# Patient Record
Sex: FEMALE | Race: WHITE
[De-identification: names, ages, dates, MRNs, and addresses within clinical notes are randomized per-mention and may not be internally consistent; named-entity substitution may affect disease eponyms.]

---

## 2017-06-29 ENCOUNTER — HOSPITAL ENCOUNTER (EMERGENCY)
Dept: HOSPITAL 39 - ER | Age: 67
Discharge: HOME | End: 2017-06-29
Payer: MEDICARE

## 2017-06-29 VITALS — SYSTOLIC BLOOD PRESSURE: 123 MMHG | DIASTOLIC BLOOD PRESSURE: 80 MMHG | OXYGEN SATURATION: 98 %

## 2017-06-29 VITALS — TEMPERATURE: 96.9 F

## 2017-06-29 DIAGNOSIS — Z79.899: ICD-10-CM
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
## 2017-06-29 DIAGNOSIS — S61.211A: Primary | ICD-10-CM

## 2017-06-29 NOTE — ED.PDOC
History of Present Illness





- General


Chief Complaint: Skin/Abrasion/Tear


Stated Complaint: laceration left index finger


Time Seen by Provider: 06/29/17 11:08


Source: patient, RN notes reviewed, Vital Signs reviewed


Exam Limitations: no limitations





- History of Present Illness


Initial Comments: 





Omayra Nguyen 66 y/o female stated that she accidentally lacerated left index 

finger with  at home 


Timing/Duration: just prior to arrival


Location: hands


Improving Factors: rest


Worsening Factors: movement


Associated Symptoms: denies symptoms


Allergies/Adverse Reactions: 


Allergies





NO KNOWN ALLERGY Allergy (Unverified 01/07/14 09:05)


 








Home Medications: 


Ambulatory Orders





Calcium  PO DAILY 01/07/14 


Cetirizine HCl [Zyrtec]  PO DAILY 01/07/14 


Multiple Vitamins W/ Minerals [Multi For Her 50+]  PO DAILY 01/07/14 


Omega-3 Fatty Acids [Fish Oil Triple Strength]  PO DAILY 01/07/14 


raNITIdine HCL [Zantac] 0 mg PO BID 01/07/14 


Cephalexin 1,000 mg PO BID #20 cap 06/29/17 











Review of Systems





- Review of Systems


Constitutional: States: no symptoms reported


EENTM: States: no symptoms reported


Respiratory: States: no symptoms reported


Cardiology: States: no symptoms reported


Gastrointestinal/Abdominal: States: no symptoms reported


Genitourinary: States: no symptoms reported


Musculoskeletal: States: no symptoms reported


Skin: States: see HPI





Past Medical History (General)





- Patient Medical History


Hx Congestive Heart Failure: No


Hx Diabetes: No





Family Medical History





- Family History


  ** Mother


Family History: Unknown





Physical Exam





- Physical Exam


General Appearance: Alert, No apparent distress


Eyes, Ears, Nose, Throat Exam: PERRL/EOMI, normal ENT inspection, TMs normal


Neck: non-tender, full range of motion, supple


Cardiovascular/Chest: normal peripheral pulses, regular rate, rhythm, no edema, 

no gallop, no murmur


Respiratory: chest non-tender, lungs clear, normal breath sounds


Gastrointestinal/Abdominal: normal bowel sounds, non tender, soft, no 

organomegaly


Extremity: normal range of motion, non-tender, normal inspection


Neurologic: no motor/sensory deficits, alert, normal mood/affect, oriented x 3


Skin Exam: warm/dry, normal color


Skin Problem Location: other - left index finger,volar aspect no nail injury


Skin Character: other - laceration non bleeding non gaping


Lymphatic: no adenopathy





Progress





- Progress


Progress: 





06/29/17 11:25


Cleanse with sterile NS then applied triple antibiotic covered with sterile 

dressing.





Departure





- Departure


Clinical Impression: 


 Laceration of left index finger





Time of Disposition: 11:28


Disposition: Discharge to Home or Self Care


Condition: Good


Instructions:  DI for Minor Laceration


Referrals: 


Clyde Dial MD [Primary Care Provider] - 1-2 Weeks


Prescriptions: 


Cephalexin 1,000 mg PO BID #20 cap


Home Medications: 


Ambulatory Orders





Calcium  PO DAILY 01/07/14 


Cetirizine HCl [Zyrtec]  PO DAILY 01/07/14 


Multiple Vitamins W/ Minerals [Multi For Her 50+]  PO DAILY 01/07/14 


Omega-3 Fatty Acids [Fish Oil Triple Strength]  PO DAILY 01/07/14 


raNITIdine HCL [Zantac] 0 mg PO BID 01/07/14 


Cephalexin 1,000 mg PO BID #20 cap 06/29/17 








Additional Instructions: 


Keep wound dressing for one week then replacer it with regular band aid

## 2017-11-21 ENCOUNTER — HOSPITAL ENCOUNTER (OUTPATIENT)
Dept: HOSPITAL 39 - MAMMO | Age: 67
Discharge: HOME | End: 2017-11-21
Attending: FAMILY MEDICINE
Payer: MEDICARE

## 2017-11-21 DIAGNOSIS — Z12.31: Primary | ICD-10-CM

## 2017-11-21 PROCEDURE — 77063 BREAST TOMOSYNTHESIS BI: CPT

## 2017-11-22 NOTE — MAM
EXAM DESCRIPTION: 

3D Screening BILATERAL : Digital Mammography.



CLINICAL HISTORY: 

67 years Female SCREENING . No complaints. No family history

breast cancer. Postmenopausal. HRT 5 or more years ago.



COMPARISON: 

2-D digital screening bilateral studies 11/8/2016 and 11/2/2015. 

   Report from prior examination also reviewed.



TECHNIQUE: 

Bilateral CC and MLO projection full-field images, 3-D

tomosynthesis digital mammographic technique. Also bilateral 

synthesized CC/ MLO full-field images.  CAD not utilized.



FINDINGS: 

The breast parenchymal density pattern is:  Scattered areas of

fibroglandular density.   No skin thickening or nipple retraction

 bilateral coarse calcifications and microcalcifications.

Bilateral vascular calcifications.

No focal, stellate mass or density, focal asymmetry , and no

suspicious microcalcifications bilaterally. Stable mammograms

compared to prior study, taking into account differences in

mammographic technique



IMPRESSION: 

BI-RADS CATEGORY: 2 - BENIGN FINDINGS.

FOLLOW UP: Routine digital bilateral  screening, one year

interval from  November 2017.



Written communication explaining the IMPRESSION and follow-up,

will be mailed to the patient and referring health care provider.



According to the American College of [employer], yearly mammograms

are recommended starting at age 40 and continuing as long as a

woman is in good health.  Any breast change noted on a breast

self-exam should be reported promptly to the patient's healthcare

provider.  Breast MRI is recommended for women with an

approximately 20-25% or greater lifetime risk of breast cancer,

including women with a strong family history of breast or ovarian

cancer and women who have been treated for Hodgkin's disease.



A negative mammographic report should not delay tissue diagnosis

in patients with significant clinical history or physical

findings.



Extremely dense breast tissue limits the sensitivity of digital

mammography. 



Electronically signed by:  Blake Foss MD  11/22/2017 1:57 PM

[facility] Workstation: [id number]

## 2018-03-16 ENCOUNTER — HOSPITAL ENCOUNTER (EMERGENCY)
Dept: HOSPITAL 39 - ER | Age: 68
Discharge: HOME | End: 2018-03-16
Payer: MEDICARE

## 2018-03-16 VITALS — OXYGEN SATURATION: 96 % | SYSTOLIC BLOOD PRESSURE: 119 MMHG | DIASTOLIC BLOOD PRESSURE: 74 MMHG

## 2018-03-16 VITALS — TEMPERATURE: 96.5 F

## 2018-03-16 DIAGNOSIS — Z87.442: ICD-10-CM

## 2018-03-16 DIAGNOSIS — Z85.828: ICD-10-CM

## 2018-03-16 DIAGNOSIS — N20.1: Primary | ICD-10-CM

## 2018-03-16 PROCEDURE — 85025 COMPLETE CBC W/AUTO DIFF WBC: CPT

## 2018-03-16 PROCEDURE — 80048 BASIC METABOLIC PNL TOTAL CA: CPT

## 2018-03-16 PROCEDURE — 83690 ASSAY OF LIPASE: CPT

## 2018-03-16 PROCEDURE — 82550 ASSAY OF CK (CPK): CPT

## 2018-03-16 PROCEDURE — 74176 CT ABD & PELVIS W/O CONTRAST: CPT

## 2018-03-16 PROCEDURE — 84484 ASSAY OF TROPONIN QUANT: CPT

## 2018-03-16 PROCEDURE — 81001 URINALYSIS AUTO W/SCOPE: CPT

## 2018-03-16 PROCEDURE — 82553 CREATINE MB FRACTION: CPT

## 2018-03-16 PROCEDURE — 85610 PROTHROMBIN TIME: CPT

## 2018-03-16 PROCEDURE — 36415 COLL VENOUS BLD VENIPUNCTURE: CPT

## 2018-03-16 PROCEDURE — 85730 THROMBOPLASTIN TIME PARTIAL: CPT

## 2018-03-16 PROCEDURE — 80076 HEPATIC FUNCTION PANEL: CPT

## 2018-03-16 NOTE — CT
EXAM DESCRIPTION: 



Abdoment/Pelvis w/o Contrast



CLINICAL HISTORY: 



68 years Female, flank pain right



COMPARISON: 



None.



TECHNIQUE: 



Contiguous axial CT images of the abdomen and pelvis were

acquired without administration of intravenous contrast. Coronal

and sagittal reformatted images are provided. 



This exam was performed according to our departmental

dose-optimization program which includes use of Automated

Exposure Control, adjustment of the mA and/or kV according to

patient size and/or use of iterative reconstruction technique.



FINDINGS: 



Chest base: Unremarkable.



Liver: Unremarkable.

Gallbladder: Unremarkable.

Spleen: Unremarkable.

Adrenals: Unremarkable.

Pancreas: Unremarkable.



Right Kidney: Obstructing 2 mm stone within the right UVJ, image

72 series 2 resulting in mild right hydronephrosis and

hydroureter. Multiple parapelvic cysts.

Left Kidney: Multiple parapelvic cysts. No renal stones or

hydronephrosis.



Aorta and branch vessels: Mild calcific atherosclerosis of the

aortoiliac vessels.

Lymph nodes: No lymphadenopathy.



Bowels: No obstruction. 

Colon: No wall thickening. 

Appendix: Normal.

Peritoneum: No free air or free fluid. 



Pelvic organs: Prior hysterectomy. No large adnexal mass.

Bladder: Unremarkable.



Bones and soft tissues: No acute osseous or soft tissue

abnormalities.



IMPRESSION: 



Obstructing 2 mm stone within the right UVJ resulting in mild

right hydronephrosis and hydroureter.



Bilateral parapelvic cysts.



Electronically signed by:  Troy Boone MD  3/16/2018 11:22 PM CDT

Workstation: [id number]

## 2018-03-16 NOTE — ED.PDOC
History of Present Illness





- General


Chief Complaint:  Problem


Stated Complaint: low back pain, diff urinating


Time Seen by Provider: 03/16/18 21:52


Source: patient


Exam Limitations: no limitations





- History of Present Illness


Initial Comments: 





Omayra Nguyen 67 y/o female stated that she had intermittent right flank pain 

radiating to lower abdominal area which started this afternoon gradually 

getting worse.Had also pain on urination no dysuria and had history of passing 

kidney stone 2x in the past.No fever /chills feels nauseated no constipation/

diarrhea.  


Timing/Duration: 4-6 hours, getting worse, intermittent


Improving Factors: nothing


Worsening Factors: nothing


Associated Symptoms: other - see hpi


Allergies/Adverse Reactions: 


Allergies





NO KNOWN ALLERGY Allergy (Unverified 01/07/14 09:05)


 








Home Medications: 


Ambulatory Orders





Calcium PO DAILY 01/07/14 


Cetirizine HCl [Zyrtec] PO DAILY 01/07/14 


Multiple Vitamins W/ Minerals [Multi For Her 50+] PO DAILY 01/07/14 


Omega-3 Fatty Acids [Fish Oil Triple Strength] PO DAILY 01/07/14 


raNITIdine HCL [Zantac] 0 mg PO BID 01/07/14 


Cephalexin 1,000 mg PO BID #20 cap 06/29/17 


Acetamin W/Cod #3 Tab [Tylenol w/CODEINE #3] 1 ea PO Q8HRS #14 tab 03/16/18 











Review of Systems





- Review of Systems


Constitutional: States: no symptoms reported


EENTM: States: no symptoms reported


Respiratory: States: no symptoms reported


Cardiology: States: no symptoms reported


Gastrointestinal/Abdominal: States: see HPI


Genitourinary: States: see HPI


All other Systems: Reviewed and Negative, No Change from Baseline





Past Medical History (General)





- Patient Medical History


Hx Seizures: No


Hx Stroke: No


Hx Asthma: No


Hx of COPD: No


Hx Cardiac Disorders: No


Hx Congestive Heart Failure: No


Hx Hypertension: No


Hx Thyroid Disease: No


Hx Diabetes: No


Hx Renal Disease: No


Hx Cancer: Yes - skin


Hx Other PMH: Yes - nephrolithiasis


Surgical History: Hysterectomy, other - hysterectomy,





- Vaccination History


Hx Tetanus, Diphtheria Vaccination: No


Hx Influenza Vaccination: No


Hx Pneumococcal Vaccination: No





- Social History


Hx Tobacco Use: No


Hx Alcohol Use: No





Family Medical History





- Family History


  ** Mother


Family History: Unknown


Hx Family Cancer: Yes - leukemia-dad





Physical Exam





- Physical Exam


General Appearance: Alert, Comfortable, No apparent distress


Eye Exam: bilateral normal


Ears, Nose, Throat: hearing grossly normal, normal ENT inspection


Neck: full range of motion, supple


Respiratory: lungs clear, normal breath sounds


Cardiovascular/Chest: normal peripheral pulses, regular rate, rhythm, no murmur


Peripheral Pulses: radial,right: 2+, radial,left: 2+


Gastrointestinal/Abdominal: soft, tenderness - lower abdomen no peritoneal signs


Back Exam: no CVA tenderness, no vertebral tenderness


Extremity: no pedal edema


Neurologic: alert, oriented x 3


Skin Exam: normal color, warm/dry





Progress





- Progress


Progress: 





03/16/18 22:04


 Vital Signs - 8 hr











  03/16/18





  21:23


 


Temperature 96.5 F L


 


Pulse Rate [ 72





left] 


 


Respiratory 18





Rate 


 


Blood Pressure 170/82





[left] 


 


O2 Sat by Pulse 99





Oximetry 














- Results/Orders


Results/Orders: 





 





03/16/18 21:53


IV Care:Saline Lock per Protoc QSHIFT 








 Laboratory Results - last 24 hr











  03/16/18 03/16/18





  21:45 21:55


 


WBC   8.3


 


RBC   4.08 L


 


Hgb   13.1


 


Hct   37.7


 


MCV   92.4


 


MCH   32.1 H


 


MCHC   34.7


 


RDW   13.0


 


Plt Count   241


 


MPV   8.3


 


Absolute Neuts (auto)   4.00


 


Absolute Lymphs (auto)   3.80 H


 


Absolute Monos (auto)   0.50


 


Absolute Eos (auto)   0.10


 


Absolute Basos (auto)   0.00


 


Neutrophils %   47.6


 


Lymphocytes %   45.1


 


Monocytes %   5.7


 


Eosinophils %   1.2


 


Basophils %   0.4


 


PT   10.4


 


INR   0.920


 


PTT (SP)   24.3 L


 


Sodium   138


 


Potassium   4.2


 


Chloride   107


 


Carbon Dioxide   25


 


Anion Gap   10.2 L


 


BUN   22 H


 


Creatinine   0.92


 


BUN/Creatinine Ratio   23.9 H


 


Random Glucose   100


 


Serum Osmolality   279.1


 


Calcium   8.9


 


Magnesium   2.2


 


Total Bilirubin   0.2


 


Direct Bilirubin   < 0.1


 


Indirect Bilirubin   0.1 L


 


AST   27


 


ALT   24


 


Alkaline Phosphatase   70


 


Creatine Kinase   307 H*


 


CK-MB (CK-2)   7.5 H*


 


CK-MB (CK-2) %   2.44


 


Troponin I   < 0.02


 


Serum Total Protein   7.0


 


Albumin   4.0


 


Lipase   34


 


Urine Color  Yellow 


 


Urine Appearance  Clear 


 


Urine pH  5.5 


 


Ur Specific Gravity  1.025 


 


Urine Protein  Negative 


 


Urine Glucose (UA)  Negative 


 


Urine Ketones  Trace 


 


Urine Blood  Trace-lysed H 


 


Urine Nitrite  Negative 


 


Urine Bilirubin  Negative 


 


Urine Urobilinogen  0.2 


 


Ur Leukocyte Esterase  Negative 


 


Urine RBC  0-1 


 


Urine WBC  3-5 H 


 


Ur Epithelial Cells  0-1 


 


Urine Bacteria  Rare 














- EKG/XRAY/CT


CT Ordered: Yes - abd/p-obstructing 2 mm. right ureteral stone UVJ





Departure





- Departure


Clinical Impression: 


 Ureteral calculus, right, Ureteral colic, Flank pain, acute





Time of Disposition: 23:33


Disposition: Discharge to Home or Self Care


Condition: Good


Departure Forms:  ED Discharge - Pt. Copy, Patient Portal Self Enrollment


Instructions:  DI for Kidney Stones


Referrals: 


Clyde Dial MD [Primary Care Provider] - 1-2 Weeks


Prescriptions: 


Acetamin W/Cod #3 Tab [Tylenol w/CODEINE #3] 1 ea PO Q8HRS #14 tab


Home Medications: 


Ambulatory Orders





Calcium PO DAILY 01/07/14 


Cetirizine HCl [Zyrtec] PO DAILY 01/07/14 


Multiple Vitamins W/ Minerals [Multi For Her 50+] PO DAILY 01/07/14 


Omega-3 Fatty Acids [Fish Oil Triple Strength] PO DAILY 01/07/14 


raNITIdine HCL [Zantac] 0 mg PO BID 01/07/14 


Cephalexin 1,000 mg PO BID #20 cap 06/29/17 


Acetamin W/Cod #3 Tab [Tylenol w/CODEINE #3] 1 ea PO Q8HRS #14 tab 03/16/18 








Additional Instructions: 


Follow up with primary Md 19 March 2018;drink extra fluids;May also take Aleve 

one tablet am/pm for pain

## 2018-03-22 ENCOUNTER — HOSPITAL ENCOUNTER (OUTPATIENT)
Dept: HOSPITAL 39 - GMAB | Age: 68
End: 2018-03-22
Attending: FAMILY MEDICINE
Payer: MEDICARE

## 2018-03-22 DIAGNOSIS — N20.9: Primary | ICD-10-CM

## 2018-12-04 ENCOUNTER — HOSPITAL ENCOUNTER (OUTPATIENT)
Dept: HOSPITAL 39 - MAMMO | Age: 68
End: 2018-12-04
Attending: FAMILY MEDICINE
Payer: MEDICARE

## 2018-12-04 DIAGNOSIS — Z12.31: Primary | ICD-10-CM

## 2018-12-04 NOTE — MAM
EXAM DESCRIPTION: 

3D Screening BILATERAL : Digital Mammography.



CLINICAL HISTORY: 

68 years Female SCREENING . No complaints. No personal or family

history of breast cancer. Childbirth. Postmenopausal 25 years.

Taken HRT 5 or more years ago..  Lifetime risk of developing

breast cancer (Tyrer-Cuzick model)(%):  3.7.



COMPARISON: 

Bilateral screening digital breast tomosynthesis 11/21/2017.    



TECHNIQUE: 

Bilateral CC and MLO projection full-field images, digital

tomosynthesis mammographic technique. Bilateral digital 2-D

full-field MLO images. CAD not available for tomosynthesis or 2-D

images.  



FINDINGS: 

The breast parenchymal density pattern is:  Scattered areas of

fibroglandular density.  No skin thickening or nipple retraction.

 Bilateral solitary microcalcifications. Large coarse

calcification medial right breast. Bilateral vascular

calcifications.

No new focal, stellate mass or density, focal asymmetry , and no

suspicious microcalcifications bilaterally. Stable mammograms

compared to prior study. 



IMPRESSION: 

Benign exam.



BIRAD CATEGORY: 2 BENIGN FINDINGS.



RECOMMENDATIONS:

FOLLOW UP: Routine digital bilateral  mammographic screening, one

year interval from  December 2018.



Written communication explaining the IMPRESSION and follow-up,

will be mailed to the patient and referring health care provider.



According to the American College of Radiology, yearly mammograms

are recommended starting at age 40 and continuing as long as a

woman is in good health.  Any breast change noted on a breast

self-exam should be reported promptly to the patient's healthcare

provider.  Breast MRI is recommended for women with an

approximately 20-25% or greater lifetime risk of breast cancer,

including women with a strong family history of breast or ovarian

cancer and women who have been treated for Hodgkin's disease.



A negative mammographic report should not delay tissue diagnosis

in patients with significant clinical history or physical

findings.



Extremely dense breast tissue limits the sensitivity of digital

mammography. 



Electronically signed by:  Blake Foss MD  12/4/2018 6:21 PM [facility]

Workstation: [id number]

## 2019-04-03 ENCOUNTER — HOSPITAL ENCOUNTER (OUTPATIENT)
Dept: HOSPITAL 39 - AMB | Age: 69
Discharge: HOME | End: 2019-04-03
Attending: INTERNAL MEDICINE
Payer: MEDICARE

## 2019-04-03 VITALS — DIASTOLIC BLOOD PRESSURE: 57 MMHG | SYSTOLIC BLOOD PRESSURE: 123 MMHG | TEMPERATURE: 96.5 F | OXYGEN SATURATION: 98 %

## 2019-04-03 DIAGNOSIS — K29.50: ICD-10-CM

## 2019-04-03 DIAGNOSIS — K64.8: ICD-10-CM

## 2019-04-03 DIAGNOSIS — B96.81: ICD-10-CM

## 2019-04-03 DIAGNOSIS — Z90.710: ICD-10-CM

## 2019-04-03 DIAGNOSIS — D12.2: ICD-10-CM

## 2019-04-03 DIAGNOSIS — Z87.11: ICD-10-CM

## 2019-04-03 DIAGNOSIS — K57.30: ICD-10-CM

## 2019-04-03 DIAGNOSIS — K21.0: Primary | ICD-10-CM

## 2019-04-03 DIAGNOSIS — Z86.010: ICD-10-CM

## 2019-04-03 DIAGNOSIS — K44.9: ICD-10-CM

## 2019-04-03 DIAGNOSIS — Z79.899: ICD-10-CM

## 2019-04-03 DIAGNOSIS — Z88.8: ICD-10-CM

## 2019-04-03 PROCEDURE — 88305 TISSUE EXAM BY PATHOLOGIST: CPT

## 2019-04-03 PROCEDURE — 43239 EGD BIOPSY SINGLE/MULTIPLE: CPT

## 2019-04-03 PROCEDURE — 45385 COLONOSCOPY W/LESION REMOVAL: CPT

## 2019-04-03 PROCEDURE — 88342 IMHCHEM/IMCYTCHM 1ST ANTB: CPT

## 2019-04-03 PROCEDURE — 00813 ANES UPR LWR GI NDSC PX: CPT

## 2019-04-03 RX ADMIN — SODIUM CHLORIDE, POTASSIUM CHLORIDE, SODIUM LACTATE AND CALCIUM CHLORIDE ONE MLS: 600; 310; 30; 20 INJECTION, SOLUTION INTRAVENOUS at 07:50

## 2019-04-03 NOTE — OP
DATE OF PROCEDURE:  04/03/19



PREOPERATIVE DIAGNOSIS:

1.  Gastroesophageal reflux disease.

2.  History of polyps.  Her last colonoscopy was in 2014.



POSTOPERATIVE DIAGNOSIS:

1.  Mild distal esophagitis.

2.  Antral gastritis.

3.  Small hiatal hernia. 

4.  Ascending colon polyp.

5.  Internal hemorrhoids.

6.  Sigmoid diverticulosis.



PROCEDURE:

1.  Esophagogastroduodenoscopy plus biopsy.

2.  Colonoscopy plus polypectomy.



SURGEON:  Rogelio Brizuela MD.



COMPLICATIONS:  None apparent.



BLOOD LOSS:  None.



MEDICATIONS:  Monitored anesthesia care.



DESCRIPTION OF PROCEDURE:  Informed consent was obtained prior to sedation.  The
preprocedure cardiopulmonary assessment was satisfactory.  The patient was 
placed in the left lateral decubitus position and was sedated.  The tip of the 
Olympus esophagogastroduodenoscope was inserted in the oropharynx and carefully 
advanced through the cricopharyngeus into the esophageal lumen.  The esophagus 
is significant for minimal reflux esophagitis.  This Grade A reflux esophagitis 
was biopsied.  The esophagus was otherwise unremarkable.  The patient has a very
small hiatal hernia.  The antrum of the stomach has some gastritis and I 
biopsied this.  Otherwise, the stomach was unremarkable on direct and 
retroflexed views.  The duodenum was examined to the second portion and it was 
unremarkable.  The scope was then removed from the patient.  



The patient was then prepared for colonoscopy.  A digital rectal exam was 
unremarkable.  The tip of the Olympus colonoscope was inserted in the rectum and
guided over to the cecum.  The sigmoid was quite tortuous and had limited 
mobility and was challenging to get the scope through the sigmoid.  Once we were
through the sigmoid, it was easy to get the scope over to the cecum.  The cecum 
was identified by locating the ileocecal valve and appendiceal orifice.  The 
mucosa of the cecum, ascending colon, hepatic flexure, transverse colon, splenic
flexure, descending colon and sigmoid colon was closely examined.  Direct and 
retroflexed views of the rectum were obtained.  The sigmoid had some 
diverticulosis.  There was a small ascending colon polyp that was fairly flat 
and had the appearance of a sessile serrated adenoma.  This was removed in a 
piecemeal fashion with a cold snare.  The patient has internal hemorrhoids.  
Otherwise, the colon was unremarkable.  The procedure was then terminated. 



RECOMMENDATIONS:  

1.  Followup polyp pathology.

2.  Continue daily proton pump inhibitor.

3.  Followup with me in about 4 months in the [place] office.



#[id number]



cc:  CRISTINA Glass MD

[facility]

## 2019-12-18 ENCOUNTER — HOSPITAL ENCOUNTER (OUTPATIENT)
Dept: HOSPITAL 39 - MAMMO | Age: 69
End: 2019-12-18
Attending: FAMILY MEDICINE
Payer: MEDICARE

## 2019-12-18 DIAGNOSIS — Z12.31: Primary | ICD-10-CM

## 2019-12-20 NOTE — MAM
EXAM DESCRIPTION: 

3D Screening BILATERAL : Digital Mammography.



CLINICAL HISTORY: 

69 years Female ANNUAL SCREENING . No personal or family history

of breast cancer. No complaints. Childbirth age 10. Menarche age

15. Menopausal age 43. HRT 5 or more years ago..  Lifetime risk

of developing breast cancer (Tyrer-Cuzick model)(%):  3.5.



COMPARISON: 

Bilateral screening digital breast tomosynthesis December 2018

and November 2017. 



TECHNIQUE: 

Bilateral CC and MLO projection full-field images, digital

tomosynthesis mammographic technique. Bilateral digital 2-D

full-field MLO images.  CAD not available for tomosynthesis or

2-D images.  



FINDINGS: 

The breast parenchymal density pattern is: Scattered areas of

fibroglandular density. No skin thickening or nipple retraction. 

Large coarse calcification anterior right breast. Bilateral

solitary microcalcifications. Bilateral vascular calcifications.

Minimal central focal asymmetry mid third of the left breast is

stable.

No new focal, stellate mass or density, focal asymmetry , and no

suspicious microcalcifications bilaterally. Stable mammograms

compared to prior study. 



IMPRESSION: 

Benign exam.



BIRAD CATEGORY: 2 BENIGN FINDINGS.



RECOMMENDATIONS:

FOLLOW UP: Routine digital bilateral  mammographic screening, one

year interval from  December 2019.



Written communication explaining the IMPRESSION and follow-up,

will be mailed to the patient and referring health care provider.



According to the American College of Radiology, yearly mammograms

are recommended starting at age 40 and continuing as long as a

woman is in good health.  Any breast change noted on a breast

self-exam should be reported promptly to the patient's healthcare

provider.  Breast MRI is recommended for women with an

approximately 20-25% or greater lifetime risk of breast cancer,

including women with a strong family history of breast or ovarian

cancer and women who have been treated for Hodgkin's disease.



A negative mammographic report should not delay tissue diagnosis

in patients with significant clinical history or physical

findings.



Extremely dense breast tissue limits the sensitivity of digital

mammography. 



Electronically signed by:  Blake Foss MD  12/20/2019 2:56 PM

[facility] Workstation: [id number]

## 2020-12-21 ENCOUNTER — HOSPITAL ENCOUNTER (OUTPATIENT)
Dept: HOSPITAL 39 - MAMMO | Age: 70
End: 2020-12-21
Attending: FAMILY MEDICINE
Payer: MEDICARE

## 2020-12-21 DIAGNOSIS — Z12.31: Primary | ICD-10-CM

## 2020-12-21 NOTE — MAM
EXAM DESCRIPTION: 

3D Screening BILATERAL : Digital Mammography.



CLINICAL HISTORY: 

70 years Female screening . No complaints. No family history of

breast cancer. Menarche age 10. Childbirth age 15. Menopause age

43. HRT 5 or more years ago.. Lifetime risk of developing breast

cancer (Tyrer-Cuzick model)(%):  3.3.



COMPARISON: 

Bilateral screening digital breast tomosynthesis December 2019

and December 2018.    



TECHNIQUE: 

Bilateral CC and MLO projection full-field images, digital

tomosynthesis mammographic technique. Bilateral digital 2-D

full-field MLO images.  CAD available for 2-D images.  



FINDINGS: 

The breast parenchymal density pattern is: Scattered areas of

fibroglandular density.  Vascular calcifications. Solitary

microcalcifications. Large coarse calcification anterior right

breast. Masslike fibroglandular tissue in the retroareolar left

breast at 2:00 is stable.  No skin thickening or nipple

retraction

No new focal, stellate mass or density, focal asymmetry , and no

suspicious microcalcifications bilaterally. Stable mammograms

compared to prior study. 



IMPRESSION: 

Benign exam.



BIRAD CATEGORY: 2 BENIGN FINDINGS.



RECOMMENDATIONS:

FOLLOW UP: Routine digital bilateral  mammographic screening, one

year interval from  December 2020.



Written communication explaining the IMPRESSION and follow-up,

will be mailed to the patient and referring health care provider.

 



According to the American College of Radiology, yearly mammograms

are recommended starting at age 40 and continuing as long as a

woman is in good health.  Any breast change noted on a breast

self-exam should be reported promptly to the patient's healthcare

provider.  Breast MRI is recommended for women with an

approximately 20-25% or greater lifetime risk of breast cancer,

including women with a strong family history of breast or ovarian

cancer and women who have been treated for Hodgkin's disease.  A

negative mammographic report should not delay tissue diagnosis in

patients with significant clinical history or physical findings. 

Extremely dense breast tissue limits the sensitivity of digital

mammography. 





Electronically signed by:  Blake Foss MD  12/21/2020 2:32 PM

[facility] Workstation: [id number]

## 2021-01-25 ENCOUNTER — HOSPITAL ENCOUNTER (OUTPATIENT)
Dept: HOSPITAL 39 - YCFC.O | Age: 71
End: 2021-01-25
Attending: FAMILY MEDICINE
Payer: MEDICARE

## 2021-01-25 DIAGNOSIS — Z03.89: Primary | ICD-10-CM
